# Patient Record
Sex: FEMALE | Race: BLACK OR AFRICAN AMERICAN | NOT HISPANIC OR LATINO
[De-identification: names, ages, dates, MRNs, and addresses within clinical notes are randomized per-mention and may not be internally consistent; named-entity substitution may affect disease eponyms.]

---

## 2018-05-16 PROBLEM — Z00.00 ENCOUNTER FOR PREVENTIVE HEALTH EXAMINATION: Status: ACTIVE | Noted: 2018-05-16

## 2018-05-18 ENCOUNTER — APPOINTMENT (OUTPATIENT)
Dept: OPHTHALMOLOGY | Facility: CLINIC | Age: 58
End: 2018-05-18
Payer: COMMERCIAL

## 2018-05-18 DIAGNOSIS — H10.13 ACUTE ATOPIC CONJUNCTIVITIS, BILATERAL: ICD-10-CM

## 2018-05-18 PROCEDURE — 92132 CPTRZD OPH DX IMG ANT SGM: CPT

## 2018-05-18 PROCEDURE — 92004 COMPRE OPH EXAM NEW PT 1/>: CPT

## 2018-05-18 RX ORDER — KETOTIFEN FUMARATE 0.25 MG/ML
0.03 SOLUTION OPHTHALMIC
Qty: 1 | Refills: 11 | Status: ACTIVE | COMMUNITY
Start: 2018-05-18 | End: 1900-01-01

## 2018-05-18 RX ORDER — FLUOROMETHOLONE 1 MG/ML
0.1 SOLUTION/ DROPS OPHTHALMIC
Qty: 1 | Refills: 0 | Status: ACTIVE | COMMUNITY
Start: 2018-05-18 | End: 1900-01-01

## 2018-06-08 ENCOUNTER — APPOINTMENT (OUTPATIENT)
Dept: OPHTHALMOLOGY | Facility: CLINIC | Age: 58
End: 2018-06-08
Payer: COMMERCIAL

## 2018-06-08 PROCEDURE — 76513 OPH US DX ANT SGM US UNI/BI: CPT

## 2018-06-08 PROCEDURE — 92020 GONIOSCOPY: CPT

## 2018-06-08 PROCEDURE — 92012 INTRM OPH EXAM EST PATIENT: CPT

## 2018-07-02 ENCOUNTER — APPOINTMENT (OUTPATIENT)
Dept: OPHTHALMOLOGY | Facility: CLINIC | Age: 58
End: 2018-07-02

## 2018-07-09 ENCOUNTER — APPOINTMENT (OUTPATIENT)
Dept: OPHTHALMOLOGY | Facility: CLINIC | Age: 58
End: 2018-07-09
Payer: COMMERCIAL

## 2018-07-09 ENCOUNTER — APPOINTMENT (OUTPATIENT)
Dept: OPHTHALMOLOGY | Facility: CLINIC | Age: 58
End: 2018-07-09

## 2018-07-09 PROCEDURE — 66761 REVISION OF IRIS: CPT | Mod: RT

## 2018-08-13 ENCOUNTER — APPOINTMENT (OUTPATIENT)
Dept: OPHTHALMOLOGY | Facility: CLINIC | Age: 58
End: 2018-08-13
Payer: COMMERCIAL

## 2018-08-13 PROCEDURE — 66761 REVISION OF IRIS: CPT | Mod: LT

## 2018-08-13 RX ORDER — PREDNISOLONE ACETATE 10 MG/ML
1 SUSPENSION/ DROPS OPHTHALMIC
Qty: 1 | Refills: 1 | Status: ACTIVE | COMMUNITY
Start: 2018-07-02 | End: 1900-01-01

## 2018-09-27 ENCOUNTER — APPOINTMENT (OUTPATIENT)
Dept: OPHTHALMOLOGY | Facility: CLINIC | Age: 58
End: 2018-09-27
Payer: COMMERCIAL

## 2018-09-27 DIAGNOSIS — H40.033 ANATOMICAL NARROW ANGLE, BILATERAL: ICD-10-CM

## 2018-09-27 PROCEDURE — 92250 FUNDUS PHOTOGRAPHY W/I&R: CPT

## 2018-09-27 PROCEDURE — 92014 COMPRE OPH EXAM EST PT 1/>: CPT

## 2018-09-27 PROCEDURE — 92020 GONIOSCOPY: CPT

## 2022-07-06 ENCOUNTER — EMERGENCY (EMERGENCY)
Facility: HOSPITAL | Age: 62
LOS: 1 days | Discharge: HOME | End: 2022-07-06
Admitting: EMERGENCY MEDICINE
Payer: COMMERCIAL

## 2022-07-06 VITALS
RESPIRATION RATE: 18 BRPM | SYSTOLIC BLOOD PRESSURE: 189 MMHG | OXYGEN SATURATION: 96 % | WEIGHT: 195.99 LBS | DIASTOLIC BLOOD PRESSURE: 94 MMHG | HEART RATE: 59 BPM | HEIGHT: 64 IN | TEMPERATURE: 98 F

## 2022-07-06 VITALS
TEMPERATURE: 98 F | OXYGEN SATURATION: 96 % | HEART RATE: 56 BPM | SYSTOLIC BLOOD PRESSURE: 187 MMHG | RESPIRATION RATE: 18 BRPM | DIASTOLIC BLOOD PRESSURE: 103 MMHG

## 2022-07-06 PROCEDURE — 99283 EMERGENCY DEPT VISIT LOW MDM: CPT

## 2022-07-06 RX ORDER — ERYTHROMYCIN BASE 5 MG/GRAM
1 OINTMENT (GRAM) OPHTHALMIC (EYE)
Qty: 15 | Refills: 0
Start: 2022-07-06 | End: 2022-07-12

## 2022-07-06 RX ORDER — BACITRACIN 500 [USP'U]/G
1 OINTMENT OPHTHALMIC ONCE
Refills: 0 | Status: DISCONTINUED | OUTPATIENT
Start: 2022-07-06 | End: 2022-07-06

## 2022-07-06 RX ORDER — ERYTHROMYCIN BASE 5 MG/GRAM
1 OINTMENT (GRAM) OPHTHALMIC (EYE) ONCE
Refills: 0 | Status: COMPLETED | OUTPATIENT
Start: 2022-07-06 | End: 2022-07-06

## 2022-07-06 RX ADMIN — Medication 1 APPLICATION(S): at 20:15

## 2022-07-06 NOTE — ED PROVIDER NOTE - PATIENT PORTAL LINK FT
You can access the FollowMyHealth Patient Portal offered by [facility] by registering at the following website: [email or web address]. By joining [employer]’s FollowMyHealth portal, you will also be able to view your health information using other applications (apps) compatible with our system.

## 2022-07-06 NOTE — ED ADULT TRIAGE NOTE - CHIEF COMPLAINT QUOTE
Patient c/o aravind eye itching/redness for a few days, seen by her doctor and prescribed drops, "my eyes aren't getting better."

## 2022-07-06 NOTE — ED PROVIDER NOTE - NSFOLLOWUPINSTRUCTIONS_ED_ALL_ED_FT
Continue taking claritin and apply cool compresses to eyes.  Use erythromycin eye ointment as prescribed.      Call to arrange follow up with eye doctor within 2-3 days.    You may call our referrals coordinator at [phone] Monday to Friday 11am-7pm for assistance with making an appointment      Blepharitis    A normal eye compared to an eye with an inflamed eyelid, or blepharitis.   Blepharitis refers to inflammation of the eyelids. It is a common condition and can cause dryness or grittiness in the eyes. Other symptoms may include:  •Reddish, scaly skin around the scalp and eyebrows.      •Burning or itching of the eyelids.      •Eye discharge at night that causes the eyelashes to stick together in the morning.      •Eyelashes that fall out.      •Redness of the eyes.      •Sensitivity to light.        Follow these instructions at home:    Pay attention to any changes in how your eyes look or feel. Tell your health care provider about any changes. Follow these instructions to help with your condition.    Keeping clean     •Wash your hands often with soap and water for at least 20 seconds.      •Clean your eyes and wash the edges of your eyelids with diluted baby shampoo or commercial eyelid wipes. Do this 2 or more times a day.      •Wash your face and eyebrows at least once a day.      •Use a clean towel each time you dry your eyelids. Do not use this towel to clean or dry other areas of your body. Do not share your towel with anyone.      General instructions     •Avoid wearing makeup until you get better. Do not share makeup with anyone.      •Avoid rubbing your eyes.      •Use warm compresses on the eyes for 5–10 minutes. Do this 1 or 2 times a day, or as told by your health care provider. You can use warm water on a towel, but a microwaveable heating pad often stays warm longer. The pad should be very warm but not hot enough to burn the skin.      •If you were prescribed an antibiotic ointment or steroid drops, apply or use the medicine as told by your health care provider. Do not stop using the medicine even if you feel better.      •Keep all follow-up visits. This is important.        Contact a health care provider if:    •Your eyelids feel hot.      •You have blisters or a rash on your eyelids.      •The inflammation gets worse or does not go away in 2–4 days.        Get help right away if:    •You have pain or redness that gets worse or spreads to other parts of your face.      •Your vision changes.      •You have pain when looking at lights or moving objects.      •You have a fever.        Summary    •Blepharitis refers to inflammation of the eyelids. It can cause dryness and grittiness in the eyes.      •Pay attention to any changes in how your eyes look or feel. Tell your health care provider about any changes.      •Follow home care instructions as told by your health care provider. Wash your hands often with soap and water for at least 20 seconds. Avoid wearing makeup. Do not rub your eyes.      •If you were prescribed an antibiotic ointment or steroid drops, apply or use the medicine as told by your health care provider.      •Get help right away if you have a fever, vision changes, pain or redness that gets worse or spreads to other parts of your face, or pain when looking at lights or moving objects.      This information is not intended to replace advice given to you by your health care provider. Make sure you discuss any questions you have with your health care provider.      Allergic Conjunctivitis, Adult       Allergic conjunctivitis is inflammation of the conjunctiva. The conjunctiva is the thin, clear membrane that covers the white part of the eye and the inner surface of the eyelid. In this condition:  •The blood vessels in the conjunctiva become irritated and swell.      •The eyes become red or pink and feel itchy.      Allergic conjunctivitis cannot be spread from person to person. This condition can develop at any age and may be outgrown.      What are the causes?    This condition is caused by allergens. These are things that can cause an allergic reaction in some people but not in other people. Common allergens include:•Outdoor allergens, such as:  •Pollen, including pollen from grass and weeds.      •Mold spores.      •Car fumes.      •Indoor allergens, such as:  •Dust.      •Smoke.      •Mold spores.      •Proteins in a pet's urine, saliva, or dander.          What increases the risk?    You may be more likely to develop this condition if you have a family history of these things:  •Allergies.    •Conditions caused by being exposed to allergens, such as:  •Allergic rhinitis. This is an allergic reaction that affects the nose.      •Bronchial asthma. This condition affects the large airways in the lungs and makes breathing difficult.      •Atopic dermatitis (eczema). This is inflammation of the skin that is long-term (chronic).          What are the signs or symptoms?    Symptoms of this condition include eyes that are:  •Itchy.      •Red.      •Watery.      •Puffy.      Your eyes may also:  •Sting or burn.      •Have clear fluid draining from them.      •Have thick mucus discharge and pain (vernal conjunctivitis).        How is this diagnosed?    This condition may be diagnosed by:   •Your medical history.       •A physical exam.       •Tests of the fluid draining from your eyes to rule out other causes.    •Other tests to confirm the diagnosis, including:  •Testing for allergies. The skin may be pricked with a tiny needle. The pricked area is then exposed to small amounts of allergens.    •Testing for other eye conditions. Tests may include:  •Blood tests.      •Tissue scrapings from your eyelid. The tissue is then checked under a microscope.            How is this treated?     This condition may be treated with:  •Cold, wet cloths (cold compresses) to soothe itching and swelling.      •Washing the face to remove allergens.    •Eye drops. These may be prescription or over-the-counter. You may need to try different types to see which one works best for you, such as:  •Eye drops that block the allergic reaction (antihistamine).      •Eye drops that reduce swelling and irritation (anti-inflammatory).      •Steroid eye drops, which may be given if other treatments have not worked (vernal conjunctivitis).        •Oral antihistamine medicines. These are medicines taken by mouth to lessen your allergic reaction. You may need these if eye drops do not help or are difficult to use.        Follow these instructions at home:    Eye care     •Apply a clean, cold compress to your eyes for 10–20 minutes, 3–4 times a day.      • Do not touch or rub your eyes.      • Do not wear contact lenses until the inflammation is gone. Wear glasses instead.      • Do not wear eye makeup until the inflammation is gone.      General instructions     •Avoid known allergens whenever possible.      •Take or apply over-the-counter and prescription medicines only as told by your health care provider. These include any eye drops.      •Drink enough fluid to keep your urine pale yellow.      •Keep all follow-up visits as told by your health care provider. This is important.        Contact a health care provider if:    •Your symptoms get worse or do not get better with treatment.      •You have mild eye pain.      •You become sensitive to light.      •You have spots or blisters on your eyes.      •You have pus draining from your eyes.      •You have a fever.        Get help right away if:    •You have redness, swelling, or other symptoms in only one eye.      •Your vision is blurred or you have other vision changes.      •You have severe eye pain.        Summary    •Allergic conjunctivitis is inflammation of the clear membrane that covers the white part of the eye and the inner surface of the eyelid.      •Take or apply over-the-counter and prescription medicines only as told by your health care provider. These include eye drops.      • Do not touch or rub your eyes.      •Contact a health care provider if your symptoms get worse or do not get better with treatment.      This information is not intended to replace advice given to you by your health care provider. Make sure you discuss any questions you have with your health care provider.

## 2022-07-06 NOTE — ED PROVIDER NOTE - CLINICAL SUMMARY MEDICAL DECISION MAKING FREE TEXT BOX
62 pmh htn p/w b/l eye irritation x 2 weeks.  on exam HEENT: ncat, eomi, perrla, b/l scleral injection, + swelling b/l inferior lids, crusting R inferior lid, no periorbital edema, no discharge, VA 20/20 OD/OU/OS, posterior OP wnl.  suspect allergic conjunctivitis w/ superimposed blepharitis.  instructed to continue claritin/cool compresses and will dc w/ emycin ointment for blepharitis.  can f/u with ophtho outpt.  noted elevated BP, has rx at home and asymptomatic- instructed to continue and f/u pmd.  discussed strict return parameters

## 2022-07-06 NOTE — ED PROVIDER NOTE - PHYSICAL EXAMINATION
Gen: well appearing, no acute distress  Skin: warm/dry, no rash noted  HEENT: ncat, eomi, perrla, b/l scleral injection, + swelling b/l inferior lids, crusting R inferior lid, no periorbital edema, no discharge, VA 20/20 OD/OU/OS, posterior OP wnl  Resp: breathing comfortably, speaking in full sentences, no dyspnea  Neuro: alert/oriented, ambulatory

## 2022-07-06 NOTE — ED ADULT NURSE NOTE - OBJECTIVE STATEMENT
Pt A&Ox4, ambulatory with steady gait, speaking in clear/full sentences, no acute distress, vital signs stable. Pt presents to the ED for 2 weeks of eye irritation and watering. Pt denies trauma, irritation.

## 2022-07-06 NOTE — ED PROVIDER NOTE - OBJECTIVE STATEMENT
62 pmh htn p/w b/l eye irritation x 2 weeks.  pt reports itching/increased tearing and redness to b/l eyes for 2 weeks. prescribed drops by her PMD but reports they aren't helping (starts w/ N, cannot recall full name).  using warm compresses and admits to rubbing a lot.  for past few days she has noticed crusting on b/l lids R>L.  pt wears glasses, no contacts.  also reports taking claritin w/o relief.  denies f/c, vision changes/flashes/floaters, eye trauma, FB sensation, headache, dizziness, congestion, sore throat, trauma

## 2022-07-08 DIAGNOSIS — I10 ESSENTIAL (PRIMARY) HYPERTENSION: ICD-10-CM

## 2022-07-08 DIAGNOSIS — H10.13 ACUTE ATOPIC CONJUNCTIVITIS, BILATERAL: ICD-10-CM

## 2022-07-08 DIAGNOSIS — H01.003 UNSPECIFIED BLEPHARITIS RIGHT EYE, UNSPECIFIED EYELID: ICD-10-CM

## 2022-07-08 DIAGNOSIS — H01.006 UNSPECIFIED BLEPHARITIS LEFT EYE, UNSPECIFIED EYELID: ICD-10-CM

## 2022-07-08 DIAGNOSIS — H57.89 OTHER SPECIFIED DISORDERS OF EYE AND ADNEXA: ICD-10-CM

## 2022-07-09 PROBLEM — I10 ESSENTIAL (PRIMARY) HYPERTENSION: Chronic | Status: ACTIVE | Noted: 2022-07-06

## 2022-07-25 ENCOUNTER — APPOINTMENT (OUTPATIENT)
Dept: OPHTHALMOLOGY | Facility: CLINIC | Age: 62
End: 2022-07-25
